# Patient Record
Sex: FEMALE | Race: WHITE | NOT HISPANIC OR LATINO | Employment: OTHER | URBAN - METROPOLITAN AREA
[De-identification: names, ages, dates, MRNs, and addresses within clinical notes are randomized per-mention and may not be internally consistent; named-entity substitution may affect disease eponyms.]

---

## 2019-11-08 ENCOUNTER — HOSPITAL ENCOUNTER (EMERGENCY)
Facility: HOSPITAL | Age: 69
Discharge: HOME | End: 2019-11-08
Admitting: EMERGENCY MEDICINE

## 2019-11-08 VITALS
HEIGHT: 68 IN | DIASTOLIC BLOOD PRESSURE: 87 MMHG | BODY MASS INDEX: 24.62 KG/M2 | WEIGHT: 162.48 LBS | RESPIRATION RATE: 16 BRPM | TEMPERATURE: 97.7 F | SYSTOLIC BLOOD PRESSURE: 146 MMHG | HEART RATE: 66 BPM | OXYGEN SATURATION: 96 %

## 2019-11-08 DIAGNOSIS — M35.01: Primary | ICD-10-CM

## 2019-11-08 PROCEDURE — 99283 EMERGENCY DEPT VISIT LOW MDM: CPT

## 2019-11-08 RX ORDER — CLONIDINE HYDROCHLORIDE 0.1 MG/1
0.1 TABLET ORAL ONCE
Status: COMPLETED | OUTPATIENT
Start: 2019-11-08 | End: 2019-11-08

## 2019-11-08 RX ORDER — ERYTHROMYCIN 5 MG/G
1 OINTMENT OPHTHALMIC ONCE
Status: COMPLETED | OUTPATIENT
Start: 2019-11-08 | End: 2019-11-08

## 2019-11-08 RX ORDER — KETOROLAC TROMETHAMINE 5 MG/ML
1 SOLUTION OPHTHALMIC 4 TIMES DAILY
Qty: 3 ML | Refills: 0 | Status: SHIPPED | OUTPATIENT
Start: 2019-11-08

## 2019-11-08 RX ORDER — PROPARACAINE HYDROCHLORIDE 5 MG/ML
1 SOLUTION/ DROPS OPHTHALMIC ONCE
Status: COMPLETED | OUTPATIENT
Start: 2019-11-08 | End: 2019-11-08

## 2019-11-08 RX ADMIN — PROPARACAINE HYDROCHLORIDE 1 DROP: 5 SOLUTION/ DROPS OPHTHALMIC at 21:49

## 2019-11-08 RX ADMIN — CLONIDINE HYDROCHLORIDE 0.1 MG: 0.1 TABLET ORAL at 22:36

## 2019-11-08 RX ADMIN — FLUORESCEIN SODIUM 1 STRIP: 1 STRIP OPHTHALMIC at 21:50

## 2019-11-08 RX ADMIN — ERYTHROMYCIN 1 APPLICATION: 5 OINTMENT OPHTHALMIC at 22:36

## 2019-11-09 NOTE — DISCHARGE INSTRUCTIONS
Use erythromycin ophthalmic 0.5 cm ribbon to the left conjunctival sac 4 times a day until gone    Follow-up with an eye doctor when you return home or on Monday or return to the emergency room if worse.

## 2019-11-09 NOTE — ED PROVIDER NOTES
Subjective   Patient is a 69-year-old female that states she has been playing bridge 7 to 10 hours a day for the last 5 days.  She is complaining of left eye pain for the last 5 hours.  She has no known trauma or injury to the eye.  She states the left eye has macular degeneration and she has very little vision in the left eye.-She rates her pain a 5/10.  She states it is a dull aching pain that is worse with exposure to light-            Review of Systems   Constitutional: Negative for chills, fatigue and fever.   HENT: Negative for congestion, tinnitus and trouble swallowing.    Eyes: Positive for photophobia, pain and redness. Negative for discharge and itching.   Respiratory: Negative for cough and shortness of breath.    Cardiovascular: Negative for chest pain and palpitations.   Gastrointestinal: Negative for abdominal pain, diarrhea, nausea and vomiting.   Genitourinary: Negative for dysuria, frequency and urgency.   Musculoskeletal: Negative for back pain, joint swelling and myalgias.   Skin: Negative for rash.   Neurological: Negative for dizziness and headaches.   Psychiatric/Behavioral: Negative for confusion.   All other systems reviewed and are negative.      Past Medical History:   Diagnosis Date   • Diabetes mellitus (CMS/[facility] [place])    • Macular degeneration        Allergies   Allergen Reactions   • Acetaminophen Hives       Past Surgical History:   Procedure Laterality Date   • TONSILLECTOMY         History reviewed. No pertinent family history.    Social History     Socioeconomic History   • Marital status:      Spouse name: Not on file   • Number of children: Not on file   • Years of education: Not on file   • Highest education level: Not on file   Tobacco Use   • Smoking status: Current Some Day Smoker   Substance and Sexual Activity   • Alcohol use: Yes     Comment: occasional   • Drug use: No           Objective   Physical Exam   Constitutional: She is oriented to person, place, and time. She  appears well-developed and well-nourished.   HENT:   Head: Normocephalic and atraumatic.   Right Ear: External ear normal.   Left Ear: External ear normal.   Nose: Nose normal.   Mouth/Throat: Oropharynx is clear and moist.   Eyes: EOM and lids are normal. Pupils are equal, round, and reactive to light. Left conjunctiva is injected. Left conjunctiva has no hemorrhage.   The left eye was anesthetized with proparacaine and floor seen stain was performed and found to have diffuse uptake-pressures were obtained and found to be an average of 15.   Neck: Normal range of motion. Neck supple.   Cardiovascular: Normal rate, regular rhythm, normal heart sounds and intact distal pulses.   Pulmonary/Chest: Effort normal and breath sounds normal. No respiratory distress. She has no wheezes.   Abdominal: She exhibits no distension and no mass. There is no tenderness. There is no rebound and no guarding.   Musculoskeletal: Normal range of motion. She exhibits no deformity.   Neurological: She is alert and oriented to person, place, and time. She displays normal reflexes. No cranial nerve deficit or sensory deficit. GCS eye subscore is 4. GCS verbal subscore is 5. GCS motor subscore is 6.   Skin: Skin is warm and dry. Capillary refill takes less than 2 seconds.   Psychiatric: She has a normal mood and affect. Her behavior is normal. Judgment and thought content normal.       Procedures           ED Course  ED Course as of Nov 08 2258 Fri Nov 08, 2019 2257 Patient's blood pressure was noted to be up she states that the last time she saw her primary care it was in the 160s.  She was treated with clonidine and was advised to follow-up with primary care when she returns home as she is here from out of town playing a bridge tournament  [KW]      ED Course User Index  [KW] Cindy Horn, MARITZA        BP (!) 200/90 Comment: Blood pressure was checked manually by nurse and provider notified of blood pressure results.   Pulse 85   " Temp 97.7 °F (36.5 °C) (Oral)   Resp 16   Ht 172.7 cm (68\")   Wt 73.7 kg (162 lb 7.7 oz)   SpO2 98%   BMI 24.70 kg/m²   Labs Reviewed - No data to display  Medications   proparacaine (ALCAINE) 0.5 % ophthalmic solution 1 drop (1 drop Left Eye Given 11/8/19 2149)   fluorescein ophthalmic strip 1 strip (1 strip Both Eyes Given 11/8/19 2150)   cloNIDine (CATAPRES) tablet 0.1 mg (0.1 mg Oral Given 11/8/19 2236)   erythromycin (ROMYCIN) ophthalmic ointment 1 application (1 application Left Eye Given 11/8/19 2236)     No radiology results for the last day            MDM    Final diagnoses:   Keratitis sicca, left eye              Cindy Horn, APRN  11/08/19 0490    "